# Patient Record
Sex: FEMALE | URBAN - METROPOLITAN AREA
[De-identification: names, ages, dates, MRNs, and addresses within clinical notes are randomized per-mention and may not be internally consistent; named-entity substitution may affect disease eponyms.]

---

## 2018-07-31 ENCOUNTER — APPOINTMENT (RX ONLY)
Dept: URBAN - METROPOLITAN AREA CLINIC 64 | Facility: CLINIC | Age: 63
Setting detail: DERMATOLOGY
End: 2018-07-31

## 2018-07-31 DIAGNOSIS — D22 MELANOCYTIC NEVI: ICD-10-CM

## 2018-07-31 DIAGNOSIS — Z85.828 PERSONAL HISTORY OF OTHER MALIGNANT NEOPLASM OF SKIN: ICD-10-CM

## 2018-07-31 DIAGNOSIS — Z71.89 OTHER SPECIFIED COUNSELING: ICD-10-CM

## 2018-07-31 DIAGNOSIS — L91.8 OTHER HYPERTROPHIC DISORDERS OF THE SKIN: ICD-10-CM

## 2018-07-31 DIAGNOSIS — L82.1 OTHER SEBORRHEIC KERATOSIS: ICD-10-CM

## 2018-07-31 DIAGNOSIS — L81.4 OTHER MELANIN HYPERPIGMENTATION: ICD-10-CM

## 2018-07-31 DIAGNOSIS — D18.0 HEMANGIOMA: ICD-10-CM

## 2018-07-31 PROBLEM — D18.01 HEMANGIOMA OF SKIN AND SUBCUTANEOUS TISSUE: Status: ACTIVE | Noted: 2018-07-31

## 2018-07-31 PROBLEM — D23.39 OTHER BENIGN NEOPLASM OF SKIN OF OTHER PARTS OF FACE: Status: ACTIVE | Noted: 2018-07-31

## 2018-07-31 PROBLEM — R23.3 SPONTANEOUS ECCHYMOSES: Status: ACTIVE | Noted: 2018-07-31

## 2018-07-31 PROBLEM — D22.5 MELANOCYTIC NEVI OF TRUNK: Status: ACTIVE | Noted: 2018-07-31

## 2018-07-31 PROCEDURE — ? COUNSELING

## 2018-07-31 PROCEDURE — 99213 OFFICE O/P EST LOW 20 MIN: CPT

## 2018-07-31 ASSESSMENT — LOCATION DETAILED DESCRIPTION DERM
LOCATION DETAILED: LEFT MEDIAL UPPER BACK
LOCATION DETAILED: LEFT LATERAL INFERIOR EYELID
LOCATION DETAILED: RIGHT CLAVICULAR SKIN
LOCATION DETAILED: LEFT PROXIMAL CALF
LOCATION DETAILED: RIGHT PROXIMAL CALF
LOCATION DETAILED: LEFT PROXIMAL DORSAL FOREARM
LOCATION DETAILED: LEFT MEDIAL FRONTAL SCALP
LOCATION DETAILED: RIGHT PROXIMAL DORSAL FOREARM
LOCATION DETAILED: LEFT MID-UPPER BACK
LOCATION DETAILED: LEFT RADIAL DORSAL HAND
LOCATION DETAILED: LEFT SUPERIOR UPPER BACK

## 2018-07-31 ASSESSMENT — LOCATION SIMPLE DESCRIPTION DERM
LOCATION SIMPLE: RIGHT CLAVICULAR SKIN
LOCATION SIMPLE: LEFT CALF
LOCATION SIMPLE: LEFT HAND
LOCATION SIMPLE: LEFT UPPER BACK
LOCATION SIMPLE: RIGHT FOREARM
LOCATION SIMPLE: RIGHT CALF
LOCATION SIMPLE: LEFT INFERIOR EYELID
LOCATION SIMPLE: LEFT SCALP
LOCATION SIMPLE: LEFT FOREARM

## 2018-07-31 ASSESSMENT — LOCATION ZONE DERM
LOCATION ZONE: EYELID
LOCATION ZONE: SCALP
LOCATION ZONE: ARM
LOCATION ZONE: TRUNK
LOCATION ZONE: HAND
LOCATION ZONE: LEG

## 2018-07-31 NOTE — PROCEDURE: COUNSELING
Detail Level: Zone
Detail Level: Generalized
Detail Level: Simple
Detail Level: Detailed
Patient Specific Counseling (Will Not Stick From Patient To Patient): Deferred to be treated by ophthalmologist , it is localized at edge of the lower lid within lashes, not red or inflamed present for a while. It  bothers her cosmetically to apply eyeliner. Explained perhaps that is a sensitive area, eye shield protection is important.

## 2018-10-10 ENCOUNTER — APPOINTMENT (RX ONLY)
Dept: URBAN - METROPOLITAN AREA CLINIC 50 | Facility: CLINIC | Age: 63
Setting detail: DERMATOLOGY
End: 2018-10-10

## 2018-10-10 DIAGNOSIS — D485 NEOPLASM OF UNCERTAIN BEHAVIOR OF SKIN: ICD-10-CM

## 2018-10-10 PROBLEM — D48.5 NEOPLASM OF UNCERTAIN BEHAVIOR OF SKIN: Status: ACTIVE | Noted: 2018-10-10

## 2018-10-10 PROCEDURE — ? BIOPSY BY SHAVE METHOD

## 2018-10-10 PROCEDURE — ? COUNSELING

## 2018-10-10 PROCEDURE — 11100: CPT

## 2018-10-10 ASSESSMENT — LOCATION SIMPLE DESCRIPTION DERM: LOCATION SIMPLE: CHEST

## 2018-10-10 ASSESSMENT — LOCATION ZONE DERM: LOCATION ZONE: TRUNK

## 2018-10-10 ASSESSMENT — LOCATION DETAILED DESCRIPTION DERM: LOCATION DETAILED: MIDDLE STERNUM

## 2018-10-10 NOTE — PROCEDURE: BIOPSY BY SHAVE METHOD
Post-Care Instructions: I reviewed with the patient in detail post-care instructions.  \\n\\nA biopsy of your skin has been taken to accurately diagnose your problem.  The results of your biopsy will be sent back to our office in approximately two (2) weeks.\\n\\nYour biopsy site must be kept clean if it is to heal rapidly.  It is a small wound which possibly will leave a tiny scar.  To clean the site, follow these instructions:\\n\\nWash your hands with soap and water and do the following:\\n\\n1. Remove the bandage tonight or tomorrow morning and wash with soapy water to clean the biopsy site; (Do not use antibacterial soap)\\n\\n2. Rinse with water; dry the area by gently blotting with soft cloth\\n\\n3. Apply a thin film of Vaseline or Aquaphor to the biopsy site until the biopsy site has healed.  Apply as many times as need to keep the site moist.  A bandage/Band-Aid is needed until the wound is healed and/or sutures are removed.\\n\\nTry not to allow a crust or scab to form at the area of the biopsy by keeping it clean with warm water and soap and by keeping the site very moist with Vaseline or Aquaphor as mentioned above.\\n\\nA follow up call will be made to you once the biopsy results are received.  If necessary, treatment options will be discussed at that time.  If you have any questions/concerns, please do not hesitate to call our office at [phone].

## 2018-10-24 ENCOUNTER — APPOINTMENT (RX ONLY)
Dept: URBAN - METROPOLITAN AREA CLINIC 71 | Facility: CLINIC | Age: 63
Setting detail: DERMATOLOGY
End: 2018-10-24

## 2018-10-24 PROBLEM — C44.529 SQUAMOUS CELL CARCINOMA OF SKIN OF OTHER PART OF TRUNK: Status: ACTIVE | Noted: 2018-10-24

## 2018-10-24 PROCEDURE — 12032 INTMD RPR S/A/T/EXT 2.6-7.5: CPT

## 2018-10-24 PROCEDURE — 11602 EXC TR-EXT MAL+MARG 1.1-2 CM: CPT

## 2018-10-24 PROCEDURE — ? EXCISION

## 2018-11-07 ENCOUNTER — APPOINTMENT (RX ONLY)
Dept: URBAN - METROPOLITAN AREA CLINIC 71 | Facility: CLINIC | Age: 63
Setting detail: DERMATOLOGY
End: 2018-11-07

## 2018-11-07 DIAGNOSIS — Z48.02 ENCOUNTER FOR REMOVAL OF SUTURES: ICD-10-CM

## 2018-11-07 PROCEDURE — ? SUTURE REMOVAL (GLOBAL PERIOD)

## 2018-11-07 PROCEDURE — 99024 POSTOP FOLLOW-UP VISIT: CPT

## 2018-11-07 ASSESSMENT — LOCATION ZONE DERM: LOCATION ZONE: TRUNK

## 2018-11-07 ASSESSMENT — LOCATION SIMPLE DESCRIPTION DERM: LOCATION SIMPLE: CHEST

## 2018-11-07 ASSESSMENT — LOCATION DETAILED DESCRIPTION DERM: LOCATION DETAILED: MIDDLE STERNUM

## 2018-11-07 NOTE — PROCEDURE: SUTURE REMOVAL (GLOBAL PERIOD)
Detail Level: Detailed
Add 12750 Cpt? (Important Note: In 2017 The Use Of 34550 Is Being Tracked By Cms To Determine Future Global Period Reimbursement For Global Periods): yes

## 2019-01-22 NOTE — PROCEDURE: EXCISION
written material/verbal instruction Bi-Rhombic Flap Text: The defect edges were debeveled with a #15 scalpel blade.  Given the location of the defect and the proximity to free margins a bi-rhombic flap was deemed most appropriate.  Using a sterile surgical marker, an appropriate rhombic flap was drawn incorporating the defect. The area thus outlined was incised deep to adipose tissue with a #15 scalpel blade.  The skin margins were undermined to an appropriate distance in all directions utilizing iris scissors.

## 2020-02-12 ENCOUNTER — APPOINTMENT (RX ONLY)
Dept: URBAN - METROPOLITAN AREA CLINIC 66 | Facility: CLINIC | Age: 65
Setting detail: DERMATOLOGY
End: 2020-02-12

## 2020-02-12 DIAGNOSIS — L81.4 OTHER MELANIN HYPERPIGMENTATION: ICD-10-CM

## 2020-02-12 DIAGNOSIS — Z71.89 OTHER SPECIFIED COUNSELING: ICD-10-CM

## 2020-02-12 DIAGNOSIS — L57.0 ACTINIC KERATOSIS: ICD-10-CM

## 2020-02-12 DIAGNOSIS — Z85.828 PERSONAL HISTORY OF OTHER MALIGNANT NEOPLASM OF SKIN: ICD-10-CM

## 2020-02-12 DIAGNOSIS — D22 MELANOCYTIC NEVI: ICD-10-CM

## 2020-02-12 DIAGNOSIS — L82.1 OTHER SEBORRHEIC KERATOSIS: ICD-10-CM

## 2020-02-12 DIAGNOSIS — D18.0 HEMANGIOMA: ICD-10-CM

## 2020-02-12 PROBLEM — D18.01 HEMANGIOMA OF SKIN AND SUBCUTANEOUS TISSUE: Status: ACTIVE | Noted: 2020-02-12

## 2020-02-12 PROBLEM — D22.5 MELANOCYTIC NEVI OF TRUNK: Status: ACTIVE | Noted: 2020-02-12

## 2020-02-12 PROCEDURE — ? COUNSELING

## 2020-02-12 PROCEDURE — 17003 DESTRUCT PREMALG LES 2-14: CPT

## 2020-02-12 PROCEDURE — ? FULL BODY SKIN EXAM

## 2020-02-12 PROCEDURE — ? LIQUID NITROGEN

## 2020-02-12 PROCEDURE — 99213 OFFICE O/P EST LOW 20 MIN: CPT | Mod: 25

## 2020-02-12 PROCEDURE — 17000 DESTRUCT PREMALG LESION: CPT

## 2020-02-12 ASSESSMENT — LOCATION DETAILED DESCRIPTION DERM
LOCATION DETAILED: EPIGASTRIC SKIN
LOCATION DETAILED: LEFT INFERIOR UPPER BACK
LOCATION DETAILED: LEFT INFERIOR CENTRAL MALAR CHEEK
LOCATION DETAILED: LEFT CENTRAL MALAR CHEEK
LOCATION DETAILED: LEFT SUPERIOR UPPER BACK

## 2020-02-12 ASSESSMENT — LOCATION ZONE DERM
LOCATION ZONE: TRUNK
LOCATION ZONE: FACE

## 2020-02-12 ASSESSMENT — LOCATION SIMPLE DESCRIPTION DERM
LOCATION SIMPLE: LEFT UPPER BACK
LOCATION SIMPLE: LEFT CHEEK
LOCATION SIMPLE: ABDOMEN

## 2020-02-12 NOTE — PROCEDURE: MIPS QUALITY
Quality 431: Preventive Care And Screening: Unhealthy Alcohol Use - Screening: Patient screened for unhealthy alcohol use using a single question and scores less than 2 times per year
Quality 226: Preventive Care And Screening: Tobacco Use: Screening And Cessation Intervention: Patient screened for tobacco use and is an ex/non-smoker
Detail Level: Detailed
Quality 130: Documentation Of Current Medications In The Medical Record: Current Medications Documented
Quality 402: Tobacco Use And Help With Quitting Among Adolescents: Patient screened for tobacco and never smoked
Quality 47: Advance Care Plan: Advance Care Planning discussed and documented; advance care plan or surrogate decision maker documented in the medical record.

## 2020-08-27 NOTE — PROCEDURE: EXCISION
Ear Star Wedge Flap Text: The defect edges were debeveled with a #15 blade scalpel.  Given the location of the defect and the proximity to free margins (helical rim) an ear star wedge flap was deemed most appropriate.  Using a sterile surgical marker, the appropriate flap was drawn incorporating the defect and placing the expected incisions between the helical rim and antihelix where possible.  The area thus outlined was incised through and through with a #15 scalpel blade. Z Plasty Text: The lesion was extirpated to the level of the fat with a #15 scalpel blade.  Given the location of the defect, shape of the defect and the proximity to free margins a Z-plasty was deemed most appropriate for repair.  Using a sterile surgical marker, the appropriate transposition arms of the Z-plasty were drawn incorporating the defect and placing the expected incisions within the relaxed skin tension lines where possible.    The area thus outlined was incised deep to adipose tissue with a #15 scalpel blade.  The skin margins were undermined to an appropriate distance in all directions utilizing iris scissors.  The opposing transposition arms were then transposed into place in opposite direction and anchored with interrupted buried subcutaneous sutures.

## 2020-10-20 ENCOUNTER — APPOINTMENT (RX ONLY)
Dept: URBAN - METROPOLITAN AREA CLINIC 66 | Facility: CLINIC | Age: 65
Setting detail: DERMATOLOGY
End: 2020-10-20

## 2020-10-20 DIAGNOSIS — L82.1 OTHER SEBORRHEIC KERATOSIS: ICD-10-CM

## 2020-10-20 DIAGNOSIS — L57.0 ACTINIC KERATOSIS: ICD-10-CM

## 2020-10-20 DIAGNOSIS — Z85.828 PERSONAL HISTORY OF OTHER MALIGNANT NEOPLASM OF SKIN: ICD-10-CM

## 2020-10-20 DIAGNOSIS — Z71.89 OTHER SPECIFIED COUNSELING: ICD-10-CM

## 2020-10-20 DIAGNOSIS — L81.4 OTHER MELANIN HYPERPIGMENTATION: ICD-10-CM

## 2020-10-20 DIAGNOSIS — D22 MELANOCYTIC NEVI: ICD-10-CM

## 2020-10-20 DIAGNOSIS — T07XXXA INSECT BITE, NONVENOMOUS, OF OTHER, MULTIPLE, AND UNSPECIFIED SITES, WITHOUT MENTION OF INFECTION: ICD-10-CM

## 2020-10-20 DIAGNOSIS — D18.0 HEMANGIOMA: ICD-10-CM

## 2020-10-20 PROBLEM — D22.5 MELANOCYTIC NEVI OF TRUNK: Status: ACTIVE | Noted: 2020-10-20

## 2020-10-20 PROBLEM — D18.01 HEMANGIOMA OF SKIN AND SUBCUTANEOUS TISSUE: Status: ACTIVE | Noted: 2020-10-20

## 2020-10-20 PROBLEM — S20.469A INSECT BITE (NONVENOMOUS) OF UNSPECIFIED BACK WALL OF THORAX, INITIAL ENCOUNTER: Status: ACTIVE | Noted: 2020-10-20

## 2020-10-20 PROCEDURE — ? LIQUID NITROGEN

## 2020-10-20 PROCEDURE — 17000 DESTRUCT PREMALG LESION: CPT

## 2020-10-20 PROCEDURE — ? COUNSELING

## 2020-10-20 PROCEDURE — 17003 DESTRUCT PREMALG LES 2-14: CPT

## 2020-10-20 PROCEDURE — 99213 OFFICE O/P EST LOW 20 MIN: CPT | Mod: 25

## 2020-10-20 ASSESSMENT — LOCATION SIMPLE DESCRIPTION DERM
LOCATION SIMPLE: LEFT UPPER BACK
LOCATION SIMPLE: ABDOMEN
LOCATION SIMPLE: RIGHT TEMPLE
LOCATION SIMPLE: NOSE

## 2020-10-20 ASSESSMENT — LOCATION ZONE DERM
LOCATION ZONE: FACE
LOCATION ZONE: TRUNK
LOCATION ZONE: NOSE

## 2020-10-20 ASSESSMENT — LOCATION DETAILED DESCRIPTION DERM
LOCATION DETAILED: EPIGASTRIC SKIN
LOCATION DETAILED: RIGHT LATERAL TEMPLE
LOCATION DETAILED: LEFT SUPERIOR UPPER BACK
LOCATION DETAILED: LEFT INFERIOR UPPER BACK
LOCATION DETAILED: LEFT NASAL ROOT

## 2020-10-20 NOTE — HPI: EVALUATION OF SKIN LESION(S)
What Type Of Note Output Would You Prefer (Optional)?: Standard Output
Hpi Title: Evaluation of Skin Lesions
Additional History: Pt states no other areas of concerns at today’s exam.

## 2021-04-20 ENCOUNTER — APPOINTMENT (RX ONLY)
Dept: URBAN - METROPOLITAN AREA CLINIC 66 | Facility: CLINIC | Age: 66
Setting detail: DERMATOLOGY
End: 2021-04-20

## 2021-04-20 DIAGNOSIS — D22 MELANOCYTIC NEVI: ICD-10-CM

## 2021-04-20 DIAGNOSIS — L82.1 OTHER SEBORRHEIC KERATOSIS: ICD-10-CM

## 2021-04-20 DIAGNOSIS — L81.4 OTHER MELANIN HYPERPIGMENTATION: ICD-10-CM

## 2021-04-20 DIAGNOSIS — Z71.89 OTHER SPECIFIED COUNSELING: ICD-10-CM

## 2021-04-20 DIAGNOSIS — D18.0 HEMANGIOMA: ICD-10-CM

## 2021-04-20 DIAGNOSIS — L57.0 ACTINIC KERATOSIS: ICD-10-CM

## 2021-04-20 PROBLEM — D18.01 HEMANGIOMA OF SKIN AND SUBCUTANEOUS TISSUE: Status: ACTIVE | Noted: 2021-04-20

## 2021-04-20 PROBLEM — D22.5 MELANOCYTIC NEVI OF TRUNK: Status: ACTIVE | Noted: 2021-04-20

## 2021-04-20 PROCEDURE — 17000 DESTRUCT PREMALG LESION: CPT

## 2021-04-20 PROCEDURE — 99213 OFFICE O/P EST LOW 20 MIN: CPT | Mod: 25

## 2021-04-20 PROCEDURE — ? SUNSCREEN RECOMMENDATIONS

## 2021-04-20 PROCEDURE — ? LIQUID NITROGEN

## 2021-04-20 PROCEDURE — ? COUNSELING

## 2021-04-20 ASSESSMENT — LOCATION SIMPLE DESCRIPTION DERM
LOCATION SIMPLE: ABDOMEN
LOCATION SIMPLE: LEFT UPPER BACK
LOCATION SIMPLE: RIGHT LIP

## 2021-04-20 ASSESSMENT — LOCATION ZONE DERM
LOCATION ZONE: TRUNK
LOCATION ZONE: LIP

## 2021-04-20 ASSESSMENT — LOCATION DETAILED DESCRIPTION DERM
LOCATION DETAILED: RIGHT INFERIOR VERMILION LIP
LOCATION DETAILED: LEFT SUPERIOR UPPER BACK
LOCATION DETAILED: EPIGASTRIC SKIN
LOCATION DETAILED: LEFT INFERIOR UPPER BACK

## 2021-10-19 ENCOUNTER — APPOINTMENT (RX ONLY)
Dept: URBAN - METROPOLITAN AREA CLINIC 66 | Facility: CLINIC | Age: 66
Setting detail: DERMATOLOGY
End: 2021-10-19

## 2021-10-19 DIAGNOSIS — Z87.2 PERSONAL HISTORY OF DISEASES OF THE SKIN AND SUBCUTANEOUS TISSUE: ICD-10-CM

## 2021-10-19 DIAGNOSIS — Z71.89 OTHER SPECIFIED COUNSELING: ICD-10-CM

## 2021-10-19 DIAGNOSIS — Z85.828 PERSONAL HISTORY OF OTHER MALIGNANT NEOPLASM OF SKIN: ICD-10-CM

## 2021-10-19 DIAGNOSIS — L72.0 EPIDERMAL CYST: ICD-10-CM

## 2021-10-19 DIAGNOSIS — D18.0 HEMANGIOMA: ICD-10-CM

## 2021-10-19 DIAGNOSIS — D22 MELANOCYTIC NEVI: ICD-10-CM

## 2021-10-19 DIAGNOSIS — L81.4 OTHER MELANIN HYPERPIGMENTATION: ICD-10-CM

## 2021-10-19 DIAGNOSIS — L82.1 OTHER SEBORRHEIC KERATOSIS: ICD-10-CM

## 2021-10-19 PROBLEM — D22.5 MELANOCYTIC NEVI OF TRUNK: Status: ACTIVE | Noted: 2021-10-19

## 2021-10-19 PROBLEM — D18.01 HEMANGIOMA OF SKIN AND SUBCUTANEOUS TISSUE: Status: ACTIVE | Noted: 2021-10-19

## 2021-10-19 PROCEDURE — ? SUNSCREEN RECOMMENDATIONS

## 2021-10-19 PROCEDURE — ? FULL BODY SKIN EXAM

## 2021-10-19 PROCEDURE — ? COUNSELING

## 2021-10-19 PROCEDURE — 99213 OFFICE O/P EST LOW 20 MIN: CPT

## 2021-10-19 ASSESSMENT — LOCATION DETAILED DESCRIPTION DERM
LOCATION DETAILED: LEFT CENTRAL FRONTAL SCALP
LOCATION DETAILED: LEFT LATERAL SUPERIOR CHEST
LOCATION DETAILED: LEFT LATERAL ABDOMEN
LOCATION DETAILED: LEFT INFERIOR UPPER BACK
LOCATION DETAILED: LEFT SUPERIOR UPPER BACK
LOCATION DETAILED: LEFT MEDIAL SUPERIOR CHEST
LOCATION DETAILED: EPIGASTRIC SKIN
LOCATION DETAILED: LEFT INFERIOR VERMILION LIP

## 2021-10-19 ASSESSMENT — LOCATION SIMPLE DESCRIPTION DERM
LOCATION SIMPLE: ABDOMEN
LOCATION SIMPLE: LEFT SCALP
LOCATION SIMPLE: ABDOMEN
LOCATION SIMPLE: CHEST
LOCATION SIMPLE: LEFT UPPER BACK
LOCATION SIMPLE: LEFT LIP

## 2021-10-19 ASSESSMENT — LOCATION ZONE DERM
LOCATION ZONE: SCALP
LOCATION ZONE: LIP
LOCATION ZONE: TRUNK
LOCATION ZONE: TRUNK

## 2022-04-19 ENCOUNTER — APPOINTMENT (RX ONLY)
Dept: URBAN - METROPOLITAN AREA CLINIC 66 | Facility: CLINIC | Age: 67
Setting detail: DERMATOLOGY
End: 2022-04-19

## 2022-04-19 DIAGNOSIS — L82.1 OTHER SEBORRHEIC KERATOSIS: ICD-10-CM

## 2022-04-19 DIAGNOSIS — Z87.2 PERSONAL HISTORY OF DISEASES OF THE SKIN AND SUBCUTANEOUS TISSUE: ICD-10-CM

## 2022-04-19 DIAGNOSIS — D22 MELANOCYTIC NEVI: ICD-10-CM

## 2022-04-19 DIAGNOSIS — Z85.828 PERSONAL HISTORY OF OTHER MALIGNANT NEOPLASM OF SKIN: ICD-10-CM

## 2022-04-19 DIAGNOSIS — D18.0 HEMANGIOMA: ICD-10-CM

## 2022-04-19 DIAGNOSIS — L81.4 OTHER MELANIN HYPERPIGMENTATION: ICD-10-CM

## 2022-04-19 DIAGNOSIS — L57.0 ACTINIC KERATOSIS: ICD-10-CM | Status: WORSENING

## 2022-04-19 DIAGNOSIS — Z71.89 OTHER SPECIFIED COUNSELING: ICD-10-CM

## 2022-04-19 PROBLEM — D22.5 MELANOCYTIC NEVI OF TRUNK: Status: ACTIVE | Noted: 2022-04-19

## 2022-04-19 PROBLEM — D18.01 HEMANGIOMA OF SKIN AND SUBCUTANEOUS TISSUE: Status: ACTIVE | Noted: 2022-04-19

## 2022-04-19 PROCEDURE — ? LIQUID NITROGEN

## 2022-04-19 PROCEDURE — 17000 DESTRUCT PREMALG LESION: CPT

## 2022-04-19 PROCEDURE — 17003 DESTRUCT PREMALG LES 2-14: CPT

## 2022-04-19 PROCEDURE — ? ADDITIONAL NOTES

## 2022-04-19 PROCEDURE — ? FULL BODY SKIN EXAM

## 2022-04-19 PROCEDURE — ? SUNSCREEN RECOMMENDATIONS

## 2022-04-19 PROCEDURE — ? COUNSELING

## 2022-04-19 PROCEDURE — 99213 OFFICE O/P EST LOW 20 MIN: CPT | Mod: 25

## 2022-04-19 ASSESSMENT — LOCATION SIMPLE DESCRIPTION DERM
LOCATION SIMPLE: ABDOMEN
LOCATION SIMPLE: LEFT TEMPLE
LOCATION SIMPLE: RIGHT TEMPLE
LOCATION SIMPLE: LEFT UPPER BACK

## 2022-04-19 ASSESSMENT — LOCATION DETAILED DESCRIPTION DERM
LOCATION DETAILED: LEFT INFERIOR UPPER BACK
LOCATION DETAILED: LEFT SUPERIOR UPPER BACK
LOCATION DETAILED: RIGHT MID TEMPLE
LOCATION DETAILED: LEFT LATERAL TEMPLE
LOCATION DETAILED: EPIGASTRIC SKIN

## 2022-04-19 ASSESSMENT — LOCATION ZONE DERM
LOCATION ZONE: FACE
LOCATION ZONE: TRUNK

## 2022-04-19 NOTE — PROCEDURE: ADDITIONAL NOTES
Additional Notes: Gave patient written information on her LN2 sprays.
Detail Level: Detailed
Render Risk Assessment In Note?: no

## 2022-04-19 NOTE — PROCEDURE: MIPS QUALITY
Quality 137: Melanoma: Continuity Of Care - Recall System: Recall system not utilized, reason not otherwise specified
Quality 111:Pneumonia Vaccination Status For Older Adults: Pneumococcal Vaccination Previously Received
Detail Level: Detailed
Quality 226: Preventive Care And Screening: Tobacco Use: Screening And Cessation Intervention: Patient screened for tobacco use and is an ex/non-smoker
Quality 130: Documentation Of Current Medications In The Medical Record: Current Medications Documented
Quality 110: Preventive Care And Screening: Influenza Immunization: Influenza Immunization Administered during Influenza season
Quality 431: Preventive Care And Screening: Unhealthy Alcohol Use - Screening: Patient not identified as an unhealthy alcohol user when screened for unhealthy alcohol use using a systematic screening method

## 2022-10-19 ENCOUNTER — APPOINTMENT (RX ONLY)
Dept: URBAN - METROPOLITAN AREA CLINIC 66 | Facility: CLINIC | Age: 67
Setting detail: DERMATOLOGY
End: 2022-10-19

## 2022-10-19 DIAGNOSIS — L82.1 OTHER SEBORRHEIC KERATOSIS: ICD-10-CM

## 2022-10-19 DIAGNOSIS — L57.0 ACTINIC KERATOSIS: ICD-10-CM

## 2022-10-19 DIAGNOSIS — D18.0 HEMANGIOMA: ICD-10-CM

## 2022-10-19 DIAGNOSIS — Z85.828 PERSONAL HISTORY OF OTHER MALIGNANT NEOPLASM OF SKIN: ICD-10-CM

## 2022-10-19 DIAGNOSIS — Z87.2 PERSONAL HISTORY OF DISEASES OF THE SKIN AND SUBCUTANEOUS TISSUE: ICD-10-CM

## 2022-10-19 DIAGNOSIS — L81.4 OTHER MELANIN HYPERPIGMENTATION: ICD-10-CM

## 2022-10-19 DIAGNOSIS — D22 MELANOCYTIC NEVI: ICD-10-CM

## 2022-10-19 DIAGNOSIS — L82.0 INFLAMED SEBORRHEIC KERATOSIS: ICD-10-CM

## 2022-10-19 DIAGNOSIS — Z71.89 OTHER SPECIFIED COUNSELING: ICD-10-CM

## 2022-10-19 PROBLEM — D18.01 HEMANGIOMA OF SKIN AND SUBCUTANEOUS TISSUE: Status: ACTIVE | Noted: 2022-10-19

## 2022-10-19 PROBLEM — D22.5 MELANOCYTIC NEVI OF TRUNK: Status: ACTIVE | Noted: 2022-10-19

## 2022-10-19 PROCEDURE — ? LIQUID NITROGEN

## 2022-10-19 PROCEDURE — 17000 DESTRUCT PREMALG LESION: CPT | Mod: 59

## 2022-10-19 PROCEDURE — ? SUNSCREEN RECOMMENDATIONS

## 2022-10-19 PROCEDURE — ? ADDITIONAL NOTES

## 2022-10-19 PROCEDURE — 99213 OFFICE O/P EST LOW 20 MIN: CPT | Mod: 25

## 2022-10-19 PROCEDURE — ? COUNSELING

## 2022-10-19 PROCEDURE — 17110 DESTRUCTION B9 LES UP TO 14: CPT

## 2022-10-19 PROCEDURE — ? FULL BODY SKIN EXAM

## 2022-10-19 ASSESSMENT — LOCATION DETAILED DESCRIPTION DERM
LOCATION DETAILED: LEFT SUPERIOR UPPER BACK
LOCATION DETAILED: RIGHT LATERAL MALAR CHEEK
LOCATION DETAILED: EPIGASTRIC SKIN
LOCATION DETAILED: LEFT INFERIOR UPPER BACK
LOCATION DETAILED: RIGHT SUPERIOR FOREHEAD

## 2022-10-19 ASSESSMENT — LOCATION SIMPLE DESCRIPTION DERM
LOCATION SIMPLE: LEFT UPPER BACK
LOCATION SIMPLE: RIGHT FOREHEAD
LOCATION SIMPLE: ABDOMEN
LOCATION SIMPLE: RIGHT CHEEK

## 2022-10-19 ASSESSMENT — LOCATION ZONE DERM
LOCATION ZONE: TRUNK
LOCATION ZONE: FACE

## 2022-10-19 NOTE — PROCEDURE: LIQUID NITROGEN
Detail Level: Detailed
Render Post-Care Instructions In Note?: no
Post-Care Instructions: I reviewed with the patient in detail post-care instructions. Patient is to wear sunprotection, and avoid picking at any of the treated lesions. Pt may apply Vaseline to crusted or scabbing areas.
Show Aperture Variable?: Yes
Number Of Freeze-Thaw Cycles: 1 freeze-thaw cycle
Duration Of Freeze Thaw-Cycle (Seconds): 0
Consent: The patient's consent was obtained including but not limited to risks of crusting, scabbing, blistering, scarring, darker or lighter pigmentary change, recurrence, incomplete removal and infection.
Spray Paint Text: The liquid nitrogen was applied to the skin utilizing a spray paint frosting technique.
Medical Necessity Information: It is in your best interest to select a reason for this procedure from the list below. All of these items fulfill various CMS LCD requirements except the new and changing color options.
Medical Necessity Clause: This procedure was medically necessary because the lesions that were treated were:

## 2023-04-19 ENCOUNTER — APPOINTMENT (RX ONLY)
Dept: URBAN - METROPOLITAN AREA CLINIC 66 | Facility: CLINIC | Age: 68
Setting detail: DERMATOLOGY
End: 2023-04-19

## 2023-04-19 DIAGNOSIS — D18.0 HEMANGIOMA: ICD-10-CM

## 2023-04-19 DIAGNOSIS — L82.1 OTHER SEBORRHEIC KERATOSIS: ICD-10-CM

## 2023-04-19 DIAGNOSIS — D22 MELANOCYTIC NEVI: ICD-10-CM

## 2023-04-19 DIAGNOSIS — L81.4 OTHER MELANIN HYPERPIGMENTATION: ICD-10-CM

## 2023-04-19 DIAGNOSIS — Z71.89 OTHER SPECIFIED COUNSELING: ICD-10-CM

## 2023-04-19 DIAGNOSIS — D485 NEOPLASM OF UNCERTAIN BEHAVIOR OF SKIN: ICD-10-CM

## 2023-04-19 DIAGNOSIS — Z85.828 PERSONAL HISTORY OF OTHER MALIGNANT NEOPLASM OF SKIN: ICD-10-CM

## 2023-04-19 PROBLEM — D18.01 HEMANGIOMA OF SKIN AND SUBCUTANEOUS TISSUE: Status: ACTIVE | Noted: 2023-04-19

## 2023-04-19 PROBLEM — D22.5 MELANOCYTIC NEVI OF TRUNK: Status: ACTIVE | Noted: 2023-04-19

## 2023-04-19 PROBLEM — D48.5 NEOPLASM OF UNCERTAIN BEHAVIOR OF SKIN: Status: ACTIVE | Noted: 2023-04-19

## 2023-04-19 PROCEDURE — 99213 OFFICE O/P EST LOW 20 MIN: CPT | Mod: 25

## 2023-04-19 PROCEDURE — ? BIOPSY BY SHAVE METHOD

## 2023-04-19 PROCEDURE — ? COUNSELING

## 2023-04-19 PROCEDURE — 11102 TANGNTL BX SKIN SINGLE LES: CPT

## 2023-04-19 PROCEDURE — ? SUNSCREEN RECOMMENDATIONS

## 2023-04-19 ASSESSMENT — LOCATION DETAILED DESCRIPTION DERM
LOCATION DETAILED: LEFT INFERIOR UPPER BACK
LOCATION DETAILED: LEFT SUPERIOR UPPER BACK
LOCATION DETAILED: EPIGASTRIC SKIN
LOCATION DETAILED: LEFT FOREHEAD

## 2023-04-19 ASSESSMENT — LOCATION SIMPLE DESCRIPTION DERM
LOCATION SIMPLE: ABDOMEN
LOCATION SIMPLE: LEFT FOREHEAD
LOCATION SIMPLE: LEFT UPPER BACK

## 2023-04-19 ASSESSMENT — LOCATION ZONE DERM
LOCATION ZONE: FACE
LOCATION ZONE: TRUNK

## 2023-04-19 NOTE — HPI: EVALUATION OF SKIN LESION(S)
What Type Of Note Output Would You Prefer (Optional)?: Standard Output
Hpi Title: Evaluation of Skin Lesions
How Severe Are Your Spot(S)?: mild
Have Your Spot(S) Been Treated In The Past?: has not been treated
Additional History: Patient has concerning lesions on her face and in left axilla.

## 2023-05-11 ENCOUNTER — APPOINTMENT (RX ONLY)
Dept: URBAN - METROPOLITAN AREA CLINIC 66 | Facility: CLINIC | Age: 68
Setting detail: DERMATOLOGY
End: 2023-05-11

## 2023-05-11 PROBLEM — C44.319 BASAL CELL CARCINOMA OF SKIN OF OTHER PARTS OF FACE: Status: ACTIVE | Noted: 2023-05-11

## 2023-05-11 PROCEDURE — 13132 CMPLX RPR F/C/C/M/N/AX/G/H/F: CPT

## 2023-05-11 PROCEDURE — 17311 MOHS 1 STAGE H/N/HF/G: CPT

## 2023-05-11 PROCEDURE — ? MOHS SURGERY

## 2023-05-11 PROCEDURE — ? REPAIR NOTE

## 2023-05-11 NOTE — PROCEDURE: REPAIR NOTE
Unique Flap 2 Text: The INSERT NAME OF MUSCLE muscle was meticulously dissected and approximated with the least amount of tension. The subcutaneous tissue was approximated followed by dermis. Parson points were approximated first followed by epidermal closure. Dressing was applied.

## 2023-05-11 NOTE — PROCEDURE: REPAIR NOTE
Detail Level: Simple Plan: Continue Tretinoin Cream 0.025% Apply a pea sized amount to face every night as tolerated (start every third night then slowly increase to every night as tolerated) Unique Flap 3 Name: Tenzel Flap

## 2023-05-11 NOTE — PROCEDURE: MOHS SURGERY
4 = No assist / stand by assistance Muscle Hinge Flap Text: The defect edges were debeveled with a #15 scalpel blade.  Given the size, depth and location of the defect and the proximity to free margins a muscle hinge flap was deemed most appropriate.  Using a sterile surgical marker, an appropriate hinge flap was drawn incorporating the defect. The area thus outlined was incised with a #15 scalpel blade.  The skin margins were undermined to an appropriate distance in all directions utilizing iris scissors.

## 2023-05-11 NOTE — PROCEDURE: MOHS SURGERY
Mohs Histo Method Verbiage: The excised tissue was then mapped, color-coded and submitted to the in-house Mohs lab for tissue processing using the Mohs protocol.\\n\\Sanjay histologic findings, including description and depth of tumor present at margins (if any) and presence or absence of perineural involvement, or scar, is noted on the Mohs map.

## 2023-10-18 ENCOUNTER — APPOINTMENT (RX ONLY)
Dept: URBAN - METROPOLITAN AREA CLINIC 66 | Facility: CLINIC | Age: 68
Setting detail: DERMATOLOGY
End: 2023-10-18

## 2023-10-18 DIAGNOSIS — D22 MELANOCYTIC NEVI: ICD-10-CM

## 2023-10-18 DIAGNOSIS — Z85.828 PERSONAL HISTORY OF OTHER MALIGNANT NEOPLASM OF SKIN: ICD-10-CM

## 2023-10-18 DIAGNOSIS — Z71.89 OTHER SPECIFIED COUNSELING: ICD-10-CM

## 2023-10-18 DIAGNOSIS — D18.0 HEMANGIOMA: ICD-10-CM

## 2023-10-18 DIAGNOSIS — L81.4 OTHER MELANIN HYPERPIGMENTATION: ICD-10-CM

## 2023-10-18 DIAGNOSIS — L82.1 OTHER SEBORRHEIC KERATOSIS: ICD-10-CM

## 2023-10-18 PROBLEM — D22.5 MELANOCYTIC NEVI OF TRUNK: Status: ACTIVE | Noted: 2023-10-18

## 2023-10-18 PROBLEM — D18.01 HEMANGIOMA OF SKIN AND SUBCUTANEOUS TISSUE: Status: ACTIVE | Noted: 2023-10-18

## 2023-10-18 PROCEDURE — ? COUNSELING

## 2023-10-18 PROCEDURE — ? FULL BODY SKIN EXAM

## 2023-10-18 PROCEDURE — 99213 OFFICE O/P EST LOW 20 MIN: CPT

## 2023-10-18 PROCEDURE — ? SUNSCREEN RECOMMENDATIONS

## 2023-10-18 ASSESSMENT — LOCATION ZONE DERM: LOCATION ZONE: TRUNK

## 2023-10-18 ASSESSMENT — LOCATION DETAILED DESCRIPTION DERM
LOCATION DETAILED: LEFT SUPERIOR UPPER BACK
LOCATION DETAILED: EPIGASTRIC SKIN

## 2023-10-18 ASSESSMENT — LOCATION SIMPLE DESCRIPTION DERM
LOCATION SIMPLE: ABDOMEN
LOCATION SIMPLE: LEFT UPPER BACK

## 2024-02-07 NOTE — PROCEDURE: REPAIR NOTE
[Dear  ___] : Dear  [unfilled], [Courtesy Letter:] : I had the pleasure of seeing your patient, [unfilled], in my office today. [Please see my note below.] : Please see my note below. [Consult Closing:] : Thank you very much for allowing me to participate in the care of this patient.  If you have any questions, please do not hesitate to contact me. [Sincerely,] : Sincerely, [FreeTextEntry2] : Padilla Ley MD  Retention Suture Bite Size: 3 mm [FreeTextEntry3] : Anthony Hernandez MD, PhD Chief, Division of Laryngology Department of Otolaryngology [facility] Pediatric Otolaryngology, [facility]  of Otolaryng[facility]

## 2024-04-23 ENCOUNTER — APPOINTMENT (RX ONLY)
Dept: URBAN - METROPOLITAN AREA CLINIC 66 | Facility: CLINIC | Age: 69
Setting detail: DERMATOLOGY
End: 2024-04-23

## 2024-04-23 DIAGNOSIS — Z71.89 OTHER SPECIFIED COUNSELING: ICD-10-CM

## 2024-04-23 DIAGNOSIS — L82.1 OTHER SEBORRHEIC KERATOSIS: ICD-10-CM

## 2024-04-23 DIAGNOSIS — L57.0 ACTINIC KERATOSIS: ICD-10-CM

## 2024-04-23 DIAGNOSIS — D18.0 HEMANGIOMA: ICD-10-CM

## 2024-04-23 DIAGNOSIS — L81.4 OTHER MELANIN HYPERPIGMENTATION: ICD-10-CM

## 2024-04-23 DIAGNOSIS — D22 MELANOCYTIC NEVI: ICD-10-CM

## 2024-04-23 DIAGNOSIS — Z85.828 PERSONAL HISTORY OF OTHER MALIGNANT NEOPLASM OF SKIN: ICD-10-CM

## 2024-04-23 PROBLEM — D22.5 MELANOCYTIC NEVI OF TRUNK: Status: ACTIVE | Noted: 2024-04-23

## 2024-04-23 PROBLEM — D18.01 HEMANGIOMA OF SKIN AND SUBCUTANEOUS TISSUE: Status: ACTIVE | Noted: 2024-04-23

## 2024-04-23 PROCEDURE — ? FULL BODY SKIN EXAM

## 2024-04-23 PROCEDURE — 17000 DESTRUCT PREMALG LESION: CPT

## 2024-04-23 PROCEDURE — ? LIQUID NITROGEN

## 2024-04-23 PROCEDURE — ? SUNSCREEN RECOMMENDATIONS

## 2024-04-23 PROCEDURE — ? COUNSELING

## 2024-04-23 PROCEDURE — 99213 OFFICE O/P EST LOW 20 MIN: CPT | Mod: 25

## 2024-04-23 ASSESSMENT — LOCATION DETAILED DESCRIPTION DERM
LOCATION DETAILED: LEFT SUPERIOR UPPER BACK
LOCATION DETAILED: EPIGASTRIC SKIN
LOCATION DETAILED: LEFT PROXIMAL DORSAL FOREARM

## 2024-04-23 ASSESSMENT — LOCATION SIMPLE DESCRIPTION DERM
LOCATION SIMPLE: LEFT FOREARM
LOCATION SIMPLE: ABDOMEN
LOCATION SIMPLE: LEFT UPPER BACK

## 2024-04-23 ASSESSMENT — LOCATION ZONE DERM
LOCATION ZONE: TRUNK
LOCATION ZONE: ARM

## 2024-10-22 ENCOUNTER — APPOINTMENT (RX ONLY)
Dept: URBAN - METROPOLITAN AREA CLINIC 66 | Facility: CLINIC | Age: 69
Setting detail: DERMATOLOGY
End: 2024-10-22

## 2024-10-22 DIAGNOSIS — Z85.828 PERSONAL HISTORY OF OTHER MALIGNANT NEOPLASM OF SKIN: ICD-10-CM

## 2024-10-22 DIAGNOSIS — D18.0 HEMANGIOMA: ICD-10-CM

## 2024-10-22 DIAGNOSIS — Z71.89 OTHER SPECIFIED COUNSELING: ICD-10-CM

## 2024-10-22 DIAGNOSIS — L82.1 OTHER SEBORRHEIC KERATOSIS: ICD-10-CM

## 2024-10-22 DIAGNOSIS — L81.4 OTHER MELANIN HYPERPIGMENTATION: ICD-10-CM

## 2024-10-22 DIAGNOSIS — D22 MELANOCYTIC NEVI: ICD-10-CM

## 2024-10-22 PROBLEM — D18.01 HEMANGIOMA OF SKIN AND SUBCUTANEOUS TISSUE: Status: ACTIVE | Noted: 2024-10-22

## 2024-10-22 PROBLEM — D22.5 MELANOCYTIC NEVI OF TRUNK: Status: ACTIVE | Noted: 2024-10-22

## 2024-10-22 PROCEDURE — ? FULL BODY SKIN EXAM

## 2024-10-22 PROCEDURE — 99213 OFFICE O/P EST LOW 20 MIN: CPT

## 2024-10-22 PROCEDURE — ? SUNSCREEN RECOMMENDATIONS

## 2024-10-22 PROCEDURE — ? COUNSELING

## 2024-10-22 ASSESSMENT — LOCATION DETAILED DESCRIPTION DERM
LOCATION DETAILED: EPIGASTRIC SKIN
LOCATION DETAILED: LEFT SUPERIOR UPPER BACK

## 2024-10-22 ASSESSMENT — LOCATION SIMPLE DESCRIPTION DERM
LOCATION SIMPLE: ABDOMEN
LOCATION SIMPLE: LEFT UPPER BACK

## 2024-10-22 ASSESSMENT — LOCATION ZONE DERM: LOCATION ZONE: TRUNK
